# Patient Record
(demographics unavailable — no encounter records)

---

## 2025-07-03 NOTE — PHYSICAL EXAM
[Bilateral] : foot and ankle bilaterally [Moderate] : moderate diffused ankle swelling [4___] : [facility] 4[unfilled]/5 [1+] : posterior tibialis pulse: 1+ [] : uses wheelchair [Right] : right ankle [The fracture is in acceptable alignment. There is progression in healing seen] : The fracture is in acceptable alignment. There is progression in healing seen [Left] : left tibia/fibula [TWNoteComboBox7] : dorsiflexion 0 degrees [de-identified] : plantar flexion 20 degrees [de-identified] : inversion 10 degrees [de-identified] : eversion 10 degrees

## 2025-07-03 NOTE — HISTORY OF PRESENT ILLNESS
[Sudden] : sudden [0] : 0 [Intermittent] : intermittent [Household chores] : household chores [Leisure] : leisure [Social interactions] : social interactions [Rest] : rest [Retired] : Work status: retired [de-identified] : Patient here for left fibula and right ankle. Patient was consulted in [facility] by DANIELLE on 6/24/25. Patient states she missed a step and fell on 6/24/25. Patient states she has no pain at this time. Patient came into office ambulating in wheelchair with splint intact.  [] : Post Surgical Visit: no [FreeTextEntry1] : Left fibula/Right ankle  [FreeTextEntry3] : 6/24/25 [FreeTextEntry5] : Patient states she missed a step and fell  [de-identified] : Movement

## 2025-07-03 NOTE — ASSESSMENT
[FreeTextEntry1] : 80yoF with right ankle nondisplaced bimalleolar fracture, nondisplaced left proximal fibular shaft fracture.  Xrays today show no change in fracture displacement compared to initial injury films at [facility].  Recommend nonsurgical management at this time.  -NWB RLE in short leg splint, Wbat LLe  -Rest, ice, compression, elevation, NSAIDs PRN for pain. -All questions answered -F/u next week with repeat right ankle xrays only and plan to convert to short leg cast   The diagnosis was explained in detail. The potential non-surgical and surgical treatments were reviewed. The relative risks and benefits of each option were considered relative to the patients age, activity level, medical history, symptom severity and previously attempted treatments.   The patient was advised to consult with their primary medical provider prior to initiation of any new medications to reduce the risk of adverse effects specific to their long-term home medications and medical history.   The patient's current medications management of their orthopedic diagnosis was reviewed today:   1) We discussed a comprehensive treatment plan that included possible pharmaceutical management involving the use of prescription strength medications including but not limited to options as oral Naprosyn 500mg BID, once daily Meloxicam 15 mg, or 500-650 mg Tylenol versus over-the-counter oral medications and topical prescriptions NSAID Pennsaid vs over the counter Voltaren gel.   2) There is a moderate risk of morbidity with further treatment, especially from use of prescription strength medications and possible side effects of these medications which include upset stomach with oral medications, skin reactions to topical medications and cardiac/renal issues with long term use.   3) I recommended that the patient follow-up with their medical physician to discuss any significant specific potential issues with long term medication use such as interactions with current medications or with exacerbation of underlying medical comorbidities.   4) The benefits and risks associated with use of injectable, oral or topical, prescription and over the counter anti-inflammatory medications were discussed with the patient. The patient voiced understanding of the risks including but not limited to bleeding, stroke, kidney dysfunction, heart disease, and were referred to the black box warning label for further information

## 2025-07-08 NOTE — ASSESSMENT
[FreeTextEntry1] : 80yoF [presenting today for f/u of right ankle nondisplaced bimalleolar fracture and nondisplaced left proximal fibular shaft fracture.  Xray's today show no change in fracture displacement compared to x-rays last week.  Recommend nonsurgical management at this time. -RLE NWB in [facility], applied today in office -Rest, ice, compression, elevation, NSAIDs PRN for pain. -All questions answered -F/u 5 weeks for cast removal and repeat x-rays   The diagnosis was explained in detail. The potential non-surgical and surgical treatments were reviewed. The relative risks and benefits of each option were considered relative to the patients age, activity level, medical history, symptom severity and previously attempted treatments.   The patient was advised to consult with their primary medical provider prior to initiation of any new medications to reduce the risk of adverse effects specific to their long-term home medications and medical history.   The patient's current medications management of their orthopedic diagnosis was reviewed today:   1) We discussed a comprehensive treatment plan that included possible pharmaceutical management involving the use of prescription strength medications including but not limited to options as oral Naprosyn 500mg BID, once daily Meloxicam 15 mg, or 500-650 mg Tylenol versus over-the-counter oral medications and topical prescriptions NSAID Pennsaid vs over the counter Voltaren gel.   2) There is a moderate risk of morbidity with further treatment, especially from use of prescription strength medications and possible side effects of these medications which include upset stomach with oral medications, skin reactions to topical medications and cardiac/renal issues with long term use.   3) I recommended that the patient follow-up with their medical physician to discuss any significant specific potential issues with long term medication use such as interactions with current medications or with exacerbation of underlying medical comorbidities.   4) The benefits and risks associated with use of injectable, oral or topical, prescription and over the counter anti-inflammatory medications were discussed with the patient. The patient voiced understanding of the risks including but not limited to bleeding, stroke, kidney dysfunction, heart disease, and were referred to the black box warning label for further information   Entered by Rinku Farooq PA-C acting as scribe. Dr. Do Attestation The documentation recorded by the scribe, in my presence, accurately reflects the service I, Dr. Do, personally performed, and the decisions made by me with my edits as appropriate.

## 2025-07-08 NOTE — PHYSICAL EXAM
[Bilateral] : foot and ankle bilaterally [Moderate] : moderate diffused ankle swelling [4___] : [facility] 4[unfilled]/5 [1+] : posterior tibialis pulse: 1+ [Right] : right ankle [The fracture is in acceptable alignment. There is progression in healing seen] : The fracture is in acceptable alignment. There is progression in healing seen [Left] : left tibia/fibula [] : no gross deformity [TWNoteComboBox7] : dorsiflexion 0 degrees [de-identified] : plantar flexion 20 degrees [de-identified] : inversion 10 degrees [de-identified] : eversion 10 degrees

## 2025-07-08 NOTE — REASON FOR VISIT
Last 5 Patient Entered Readings                                      Current 30 Day Average: 132/71     Recent Readings 1/13/2018 1/13/2018 1/13/2018 1/13/2018 1/13/2018    SBP (mmHg) 142 141 138 150 150    DBP (mmHg) 65 71 73 76 83    Pulse 86 88 90 91 92          Pt reports taking BP reading while watching the football game and that caused an elevation in her BP readings.     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    3.Hypotension/Hypertension symptoms: yes   Frequency/Alleviating factors/Precipitating factors, etc.     4.Patient has been compliant with the medication regimen.     Follow up with Ms. Graciela Aranda completed. No further questions or concerns. I will follow up in a few weeks to assess progress.        [FreeTextEntry2] : Fracture care